# Patient Record
Sex: FEMALE | Race: WHITE | Employment: STUDENT | URBAN - METROPOLITAN AREA
[De-identification: names, ages, dates, MRNs, and addresses within clinical notes are randomized per-mention and may not be internally consistent; named-entity substitution may affect disease eponyms.]

---

## 2022-06-09 ENCOUNTER — OFFICE VISIT (OUTPATIENT)
Dept: OBGYN CLINIC | Age: 22
End: 2022-06-09
Payer: COMMERCIAL

## 2022-06-09 VITALS — SYSTOLIC BLOOD PRESSURE: 110 MMHG | DIASTOLIC BLOOD PRESSURE: 78 MMHG | BODY MASS INDEX: 19.44 KG/M2 | WEIGHT: 126 LBS

## 2022-06-09 DIAGNOSIS — R10.2 PELVIC AND PERINEAL PAIN: ICD-10-CM

## 2022-06-09 DIAGNOSIS — N83.201 UNSPECIFIED OVARIAN CYST, RIGHT SIDE: Primary | ICD-10-CM

## 2022-06-09 PROCEDURE — 99214 OFFICE O/P EST MOD 30 MIN: CPT | Performed by: OBSTETRICS & GYNECOLOGY

## 2022-06-09 PROCEDURE — 76856 US EXAM PELVIC COMPLETE: CPT | Performed by: OBSTETRICS & GYNECOLOGY

## 2022-06-09 NOTE — PROGRESS NOTES
Patient's last menstrual period was 05/19/2022. Past Surgical History:   Procedure Laterality Date    OTHER SURGICAL HISTORY      plastic surgery to lip     Past Medical History:   Diagnosis Date    Need for HPV vaccination 02/03/2022     History reviewed. No pertinent family history. Physical Exam:/78   Wt 126 lb (57.2 kg)   LMP 05/19/2022   Breastfeeding No   BMI 19.44 kg/m²     Patient is a 25 y.o. female who appears pleasant, in no apparent distress, her given age, well developed, well nourished and with good attention to hygiene and body habitus. Oriented to person, place and time. Mood and affect normal and appropriate to situation. Head is normocephalic, atraumatic, without any gross head or neck masses. PE not repeated    ASSESSMENT and PLAN  1. Follow up:  R ov cyst today, stable:  5.7 X 4.0 X 4.9CM vol 62    Long d/w pt and her mom, options include surgery w/ ov cystectomy vs OCs and expectant management (in general OCs do not facilitate regression of a current cyst but may help prevent development of another cyst.)    Pros/cons of OCs/safety profile also discussed. Torsion/rupture signs/sx reviewed. Pt advised I'm leaning more toward ov cystectomy if there is any further increase in the size of the cyst. If the cyst enlarges further it will stretch the ovarian cortex so thin that cystectomy with ovarian preservation becomes logistically more difficult to accomplish and increases the risk of oophorectomy. Honey Mendoza and her mom want to discuss this further and will let me know how they want to proceed. At a minimum recommend repeating her US in ~ 3 month for interval change. Orders Placed This Encounter   Procedures    AMB POC US, PELVIC COMPLETE        Diagnosis Orders   1.  Unspecified ovarian cyst, right side  AMB POC US, PELVIC COMPLETE   2. Pelvic and perineal pain  AMB POC US, PELVIC COMPLETE       More than 50% of this 30-35+ minute visit was spent addressing the above patient concerns including:  assessment, extensive chart review and counseling the patient about the above concerns including evaluation plan, treatment strategies & documentation. Long conversation with patient and her mom, all their questions answered and addressed all their concerns. Consider robotic ov cystectomy if the cyst does not regress or enlarges further.        Kayli Becker MD, MD

## 2022-10-06 ENCOUNTER — OFFICE VISIT (OUTPATIENT)
Dept: OBGYN CLINIC | Age: 22
End: 2022-10-06
Payer: COMMERCIAL

## 2022-10-06 VITALS — DIASTOLIC BLOOD PRESSURE: 80 MMHG | SYSTOLIC BLOOD PRESSURE: 128 MMHG | WEIGHT: 129 LBS | BODY MASS INDEX: 19.91 KG/M2

## 2022-10-06 DIAGNOSIS — N83.201 RIGHT OVARIAN CYST: Primary | ICD-10-CM

## 2022-10-06 DIAGNOSIS — N83.201 UNSPECIFIED OVARIAN CYST, RIGHT SIDE: ICD-10-CM

## 2022-10-06 DIAGNOSIS — N94.6 DYSMENORRHEA: ICD-10-CM

## 2022-10-06 DIAGNOSIS — R10.2 PELVIC PAIN: ICD-10-CM

## 2022-10-06 PROCEDURE — 76856 US EXAM PELVIC COMPLETE: CPT | Performed by: OBSTETRICS & GYNECOLOGY

## 2022-10-06 PROCEDURE — 99214 OFFICE O/P EST MOD 30 MIN: CPT | Performed by: OBSTETRICS & GYNECOLOGY

## 2022-10-06 NOTE — PROGRESS NOTES
Follow up visit    Pilar Iverson   22 y.o.  2000  [id number]    Today:  10/6/2022    Chief Complaint   Patient presents with    Ultrasound            Ovarian Cyst     HPI:   Persistent R ovarian cyst.  Reports occasional cramping, lasts a couple mines then resolves, often waxes and wants prior to her period. Sometimes flares pre menstrual.   Advised OCs at previous/multiple ovs, pt and her mom declined     BC:   abstinence       SYNOPSIS:    2/1/2022 [facility] US:  EXAM:   TRANSABDOMINAL PELVIC US:   HISTORY: eval ovarian torsion;   COMPARISONS: CT dated the same. US FINDINGS:   Uterus measures 7.1 x 3.5 x 4.4 cm for a volume of 56 mL. No focal abnormality. Endometrium measures 10 mm thickness. No discrete abnormality of the cervix. Right ovary measures 7.1 x 5.0 x 5.5 cm for a volume of 103 mL. Preserved ovarian parenchymal blood flow. 5.4 cm right ovarian complex cyst with peripheral mural nodularity. Left ovary measures 6.5 x 2.8 x 4.3 cm for a volume of 40 mL. Preserved ovarian parenchymal blood flow. 2.4 cm left ovarian probable corpus luteum. Moderate pelvic free fluid. 3/17/2022 US today: ANTEVERTED UTERUS, 8/5/4, vol 71  ENDOMETRIUM - 9.4MM  RT OV - THERE IS A PARAOVARIAN CYST MEASURING 5 X 4.3 X 5.3CM, vol 58.5  WITH THE APPEARANCE OF TINY AMOUNT OF RETAIN CLOT ALONG  THE POSTERIOR AND ANTERIOR WALLS (<1CM EACH),  BLOOD FLOW IS SEEN WITHIN THE STROMA. LT OV - WNL  NO FF.        6/9/2022 US today:  TA SCAN OF THE UT AND OVARIES WITH A FULL BLADDER. UT, 8/5/3, vol 68,  IS ANTEVERTED AND HETEROGENOUS. ENDOMETRIUM= 8.1MM. LT OV- SMALL,COMPLEX CYST. 1.8 X 0.8 X 1.5CM. NORMAL BLOOD FLOW IN THE STROMA. RT OV- APPEARS POLYCYSTIC. NORMAL BLOOD FLOW IN THE STROMA. JUST MEDIAL TO THE RT OV, THERE IS A LARGE, COMPLEX, PARAOVARIAN CYST,  MEASURING APPROX. 5.7 X 4.0 X 4.9CM vol 57, WITH A RETAINED CLOT ON THE POSTERIOR  WALL OF THE CYST, MEASURING 1.5 X 0.5 X 0.9CM.   NO PELVIC FF.     BC: abstinence    ASSESSMENT and PLAN  1. Follow up:  R ov cyst today, stable:  5.7 X 4.0 X 4.9CM vol 62     Long d/w pt and her mom, options:  include surgery w/ ov cystectomy vs OCs and expectant management (in general OCs do not facilitate regression of a current cyst but may help prevent development of another cyst.)    Pros/cons of OCs/safety profile also discussed. Torsion/rupture signs/sx reviewed. Pt advised I'm leaning more toward ov cystectomy if there is any further increase in the size of the cyst. If the cyst enlarges further it will stretch the ovarian cortex so thin that cystectomy with ovarian preservation becomes logistically more difficult to accomplish and increases the risk of oophorectomy. Lashaun Burris and her mom want to discuss this further and will let me know how they want to proceed. At a minimum recommend repeating her US in ~ 3 month for interval change. 10/6/2022 US today:  TA ULTRASOUND OF THE UT AND OVARIES WITH A FULL BLADDER. ANTEVERTED UT 9.5/5/3, vol 81, SLIGHTLY HETEROGENEOUS.  ENDOMETRIUM - 6.77MM  RT OV - APPEARS POLYCYSTIC, JUST MEDIAL TO THE RT OV  THERE IS A KNONWN LARGE, COMPLEX PARAOVARIAN CYST  MEASURING 5.6 X 4.4 X 4.5CM WITH RETAINED CLOT STILL  VISUALIZED ALONG THE POSTERIOR WALL MEASURING 1.3 X 1.5 X 0.9CM. LT OV - APPEARS WNL TODAY  NO FF      OB History          0    Para   0    Term   0       0    AB   0    Living   0         SAB   0    IAB   0    Ectopic   0    Molar   0    Multiple   0    Live Births   0              Patient's last menstrual period was 2022 (exact date). Past Surgical History:   Procedure Laterality Date    OTHER SURGICAL HISTORY      plastic surgery to lip     Past Medical History:   Diagnosis Date    Need for HPV vaccination 2022     History reviewed. No pertinent family history.     Review of Systems   Genitourinary:         Sporadic pelvic pain  Dysmenorrhea     Updated from patient's \"Review of Systems\" form that patient completed. Physical Exam:   /80   Wt 129 lb (58.5 kg)   LMP 09/09/2022 (Exact Date)   BMI 19.91 kg/m²     Patient is a 25 y.o. female who appears pleasant, in no apparent distress, her given age, well developed, well nourished and with good attention to hygiene and body habitus. Oriented to person, place and time. Mood and affect normal and appropriate to situation. Head is normocephalic, atraumatic, without any gross head or neck masses. PE not repeated    A/P:  1. Follow up:   R ov cyst stable, advise surgery, will refer to Dr. John Zamora for possible robotically assisted ovarian cystectomy, pt advised every effort will be made to conserve her right ov. 2/1/2022 [facility] ER/US:  5.4 cm right ovarian complex cyst with peripheral mural nodularity. 2/3/2022:  ov, advised surgery vs expectant management, advised OCs for ovarian cyst suppression & dysmenorrhea, they will let me know  3/17/2022--> R ov cyst:  5 X 4.3 X 5.3CM,  6/9/2022--> R ov cyst:  5.7 X 4.0 X 4.9CM   10/6/2022--> R ov cyst: 5.6 X 4.4 X 4.5CM  never started Ocs    2.   26 yo, needs pap, abstinent, schedule    Orders Placed This Encounter   Procedures    AMB POC US, PELVIC COMPLETE    External Referral to Reproductive Endocrinology        Diagnosis Orders   1. Unspecified ovarian cyst, right side  AMB POC US, PELVIC COMPLETE    External Referral to Reproductive Endocrinology      2. Pelvic and perineal pain  AMB POC US, PELVIC COMPLETE      3. Right ovarian cyst          More than 50% of this 30-35+  minute visit was spent addressing the above patient concerns including:  assessment, extensive chart review and counseling the patient about the above concerns including evaluation plan, treatment strategies & documentation. Long conversation with patient, all her questions answered and addressed all her concerns.     Lolita Hummel MD, [facility]

## 2022-10-16 PROBLEM — N83.201 RIGHT OVARIAN CYST: Status: ACTIVE | Noted: 2022-02-01

## 2023-02-28 ENCOUNTER — PATIENT MESSAGE (OUTPATIENT)
Dept: OBGYN CLINIC | Age: 23
End: 2023-02-28

## 2023-03-01 ENCOUNTER — TELEPHONE (OUTPATIENT)
Dept: OBGYN CLINIC | Age: 23
End: 2023-03-01

## 2023-03-01 NOTE — TELEPHONE ENCOUNTER
----- Message from Kimberly Josue MD sent at 3/1/2023 12:48 PM EST -----  Please see my attached note, call and notify pt. Sent [employer] message after seeing that you left a message. Will follow up with pt the next day if she has not reached back out.

## 2023-03-01 NOTE — TELEPHONE ENCOUNTER
Called patient and left a message, I spoke to Dr. Linford Boast today and he agrees to see her for a surgery consult. Apparently there was some confusion when patient was scheduled.   Dr. Jeff Whatley will have his office call [facility]